# Patient Record
Sex: MALE | Race: WHITE
[De-identification: names, ages, dates, MRNs, and addresses within clinical notes are randomized per-mention and may not be internally consistent; named-entity substitution may affect disease eponyms.]

---

## 2021-08-23 ENCOUNTER — HOSPITAL ENCOUNTER (OUTPATIENT)
Dept: HOSPITAL 53 - M RAD | Age: 46
End: 2021-08-23
Payer: COMMERCIAL

## 2021-08-23 DIAGNOSIS — R31.29: ICD-10-CM

## 2021-08-23 DIAGNOSIS — N20.0: Primary | ICD-10-CM

## 2021-08-23 PROCEDURE — 74178 CT ABD&PLV WO CNTR FLWD CNTR: CPT

## 2021-08-24 NOTE — REP
INDICATION:

MICROSCOPIC HEMATURIA.



COMPARISON:

None



TECHNIQUE:

Axial precontrast, contrast-enhanced and delayed images from the lung bases to the

pubic symphysis using 100 cc Isovue 370 intravenous contrast material.  Coronal and

sagittal reformations obtained.



This CT examination was performed using the following dose reduction techniques:

Automated exposure control, adjustment of mA and/or kv according to the patient's

size, and the use of iterative reconstruction technique.



FINDINGS:

Kidneys demonstrate very mild medullary nephrocalcinosis along with 2 mm

nonobstructing left renal calculus.  No perinephric stranding, hydroureteronephrosis,

or obstructing ureteral calculi identified.



Liver, spleen, pancreas, gallbladder, bilateral adrenal glands are normal.



The enteric system including stomach, small, and large bowel appears normal.  No

evidence for obstruction or acute inflammatory process.  Normal terminal ileum and

appendix are identified in the right lower quadrant.



Pelvis demonstrates normal bladder and age-appropriate prostate/seminal vesicles.



No ascites.  No free air.  No intraperitoneal or retroperitoneal adenopathy.

Abdominal aorta and vasculature appear normal.  Musculoskeletal structures are intact

and without acute osseous abnormality.



IMPRESSION:

1. No acute abdominopelvic pathology appreciated.

2. Subtle medullary nephrocalcinosis and 2 mm nonobstructing left renal calculus noted.





<Electronically signed by Nikko Robles > 08/24/21 [id number]